# Patient Record
Sex: MALE | Race: WHITE | NOT HISPANIC OR LATINO | Employment: OTHER | URBAN - METROPOLITAN AREA
[De-identification: names, ages, dates, MRNs, and addresses within clinical notes are randomized per-mention and may not be internally consistent; named-entity substitution may affect disease eponyms.]

---

## 2022-06-21 DIAGNOSIS — N18.31 CHRONIC KIDNEY DISEASE, STAGE 3A: Primary | ICD-10-CM

## 2022-06-22 PROBLEM — E55.9 VITAMIN D DEFICIENCY: Status: ACTIVE | Noted: 2022-06-22

## 2022-06-22 PROBLEM — E78.2 HYPERLIPIDEMIA, MIXED: Status: ACTIVE | Noted: 2022-06-22

## 2022-06-22 PROBLEM — N25.81 SECONDARY HYPERPARATHYROIDISM OF RENAL ORIGIN: Status: ACTIVE | Noted: 2022-06-22

## 2022-06-22 PROBLEM — I25.10 CORONARY ARTERY DISEASE INVOLVING NATIVE CORONARY ARTERY OF NATIVE HEART: Status: ACTIVE | Noted: 2022-06-22

## 2022-06-22 PROBLEM — R80.1 PERSISTENT PROTEINURIA: Status: ACTIVE | Noted: 2022-06-22

## 2022-06-22 PROBLEM — N18.31 STAGE 3A CHRONIC KIDNEY DISEASE: Status: ACTIVE | Noted: 2022-06-22

## 2022-06-22 PROBLEM — E11.9 DIABETES MELLITUS, TYPE II, INSULIN DEPENDENT: Status: ACTIVE | Noted: 2022-06-22

## 2022-06-22 PROBLEM — Z79.4 DIABETES MELLITUS, TYPE II, INSULIN DEPENDENT: Status: ACTIVE | Noted: 2022-06-22

## 2022-06-22 PROBLEM — I10 ESSENTIAL HYPERTENSION: Status: ACTIVE | Noted: 2022-06-22

## 2022-06-22 RX ORDER — METFORMIN HYDROCHLORIDE 1000 MG/1
TABLET ORAL 2 TIMES DAILY WITH MEALS
COMMUNITY
Start: 2021-12-15 | End: 2022-10-27

## 2022-06-22 RX ORDER — AMIODARONE HYDROCHLORIDE 200 MG/1
100 TABLET ORAL DAILY
COMMUNITY
Start: 2022-06-01

## 2022-06-22 RX ORDER — OXYCODONE AND ACETAMINOPHEN 10; 325 MG/1; MG/1
1 TABLET ORAL EVERY 8 HOURS PRN
COMMUNITY

## 2022-06-22 RX ORDER — ERGOCALCIFEROL 1.25 MG/1
50000 CAPSULE ORAL
COMMUNITY
Start: 2022-01-06 | End: 2023-06-09

## 2022-06-22 RX ORDER — FUROSEMIDE 20 MG/1
20 TABLET ORAL
COMMUNITY

## 2022-06-22 RX ORDER — ALBUTEROL SULFATE 90 UG/1
AEROSOL, METERED RESPIRATORY (INHALATION)
COMMUNITY
Start: 2022-06-06 | End: 2022-06-23

## 2022-06-22 RX ORDER — PANTOPRAZOLE SODIUM 40 MG/1
TABLET, DELAYED RELEASE ORAL
COMMUNITY
Start: 2021-12-09 | End: 2022-06-23

## 2022-06-22 RX ORDER — ERGOCALCIFEROL 1.25 MG/1
CAPSULE ORAL
COMMUNITY
Start: 2022-03-31 | End: 2022-06-23

## 2022-06-22 RX ORDER — HUMAN INSULIN 100 [IU]/ML
INJECTION, SUSPENSION SUBCUTANEOUS
COMMUNITY
End: 2022-10-27

## 2022-06-22 RX ORDER — TIZANIDINE 4 MG/1
4 TABLET ORAL
COMMUNITY

## 2022-06-22 RX ORDER — PANTOPRAZOLE SODIUM 40 MG/1
40 TABLET, DELAYED RELEASE ORAL DAILY
COMMUNITY
Start: 2022-06-01

## 2022-06-22 RX ORDER — AMLODIPINE BESYLATE 5 MG/1
5 TABLET ORAL 2 TIMES DAILY
COMMUNITY
Start: 2021-07-29

## 2022-06-22 RX ORDER — AMLODIPINE BESYLATE 5 MG/1
5 TABLET ORAL 2 TIMES DAILY
COMMUNITY
Start: 2022-05-23 | End: 2022-06-23

## 2022-06-22 RX ORDER — LISINOPRIL 20 MG/1
20 TABLET ORAL DAILY
COMMUNITY
Start: 2022-06-01

## 2022-06-22 RX ORDER — ATENOLOL 50 MG/1
TABLET ORAL
COMMUNITY
End: 2022-06-23

## 2022-06-22 RX ORDER — GLIPIZIDE 5 MG/1
5 TABLET, FILM COATED, EXTENDED RELEASE ORAL DAILY
COMMUNITY
Start: 2022-01-11 | End: 2022-06-23

## 2022-06-22 RX ORDER — EMPAGLIFLOZIN 10 MG/1
TABLET, FILM COATED ORAL
COMMUNITY
End: 2022-06-23

## 2022-06-22 RX ORDER — LISINOPRIL 40 MG/1
TABLET ORAL
COMMUNITY
Start: 2022-05-05 | End: 2022-06-23

## 2022-06-22 RX ORDER — CALCIUM CARBONATE 400(1000)
TABLET,CHEWABLE ORAL
COMMUNITY
End: 2022-10-27

## 2022-06-22 RX ORDER — ALBUTEROL SULFATE 90 UG/1
2 AEROSOL, METERED RESPIRATORY (INHALATION) EVERY 6 HOURS PRN
COMMUNITY
Start: 2022-06-06 | End: 2022-06-23

## 2022-06-22 RX ORDER — GLIPIZIDE 5 MG/1
5 TABLET, FILM COATED, EXTENDED RELEASE ORAL
COMMUNITY
Start: 2021-12-15 | End: 2022-06-23

## 2022-06-22 RX ORDER — LEVOTHYROXINE SODIUM 75 UG/1
75 TABLET ORAL DAILY
COMMUNITY
Start: 2022-03-31

## 2022-06-22 RX ORDER — METHOCARBAMOL 500 MG/1
TABLET, FILM COATED ORAL
COMMUNITY
End: 2022-06-23

## 2022-06-22 RX ORDER — AMIODARONE HYDROCHLORIDE 200 MG/1
100 TABLET ORAL DAILY
COMMUNITY
Start: 2021-07-29 | End: 2022-06-23

## 2022-06-22 RX ORDER — CARVEDILOL 12.5 MG/1
6.25 TABLET ORAL 2 TIMES DAILY WITH MEALS
COMMUNITY

## 2022-06-22 RX ORDER — ORAL SEMAGLUTIDE 7 MG/1
TABLET ORAL
COMMUNITY
Start: 2022-03-31 | End: 2022-06-23

## 2022-06-22 RX ORDER — TAMSULOSIN HYDROCHLORIDE 0.4 MG/1
0.4 CAPSULE ORAL DAILY
COMMUNITY
Start: 2022-01-21

## 2022-06-22 RX ORDER — LEVOTHYROXINE SODIUM 50 UG/1
TABLET ORAL
COMMUNITY
Start: 2022-03-31 | End: 2022-06-23

## 2022-06-22 RX ORDER — DOXYCYCLINE 100 MG/1
100 CAPSULE ORAL 2 TIMES DAILY
COMMUNITY
Start: 2022-06-06 | End: 2022-06-23

## 2022-06-23 ENCOUNTER — OFFICE VISIT (OUTPATIENT)
Dept: NEPHROLOGY | Facility: CLINIC | Age: 72
End: 2022-06-23
Payer: COMMERCIAL

## 2022-06-23 VITALS
WEIGHT: 262.19 LBS | HEART RATE: 63 BPM | HEIGHT: 76 IN | SYSTOLIC BLOOD PRESSURE: 139 MMHG | OXYGEN SATURATION: 98 % | DIASTOLIC BLOOD PRESSURE: 68 MMHG | BODY MASS INDEX: 31.93 KG/M2 | TEMPERATURE: 99 F

## 2022-06-23 DIAGNOSIS — R80.1 PERSISTENT PROTEINURIA: ICD-10-CM

## 2022-06-23 DIAGNOSIS — E11.9 DIABETES MELLITUS, TYPE II, INSULIN DEPENDENT: Primary | ICD-10-CM

## 2022-06-23 DIAGNOSIS — E55.9 VITAMIN D DEFICIENCY: ICD-10-CM

## 2022-06-23 DIAGNOSIS — E78.2 HYPERLIPIDEMIA, MIXED: ICD-10-CM

## 2022-06-23 DIAGNOSIS — I25.10 CORONARY ARTERY DISEASE INVOLVING NATIVE CORONARY ARTERY OF NATIVE HEART, UNSPECIFIED WHETHER ANGINA PRESENT: ICD-10-CM

## 2022-06-23 DIAGNOSIS — N25.81 SECONDARY HYPERPARATHYROIDISM OF RENAL ORIGIN: ICD-10-CM

## 2022-06-23 DIAGNOSIS — N18.31 STAGE 3A CHRONIC KIDNEY DISEASE: ICD-10-CM

## 2022-06-23 DIAGNOSIS — I10 ESSENTIAL HYPERTENSION: ICD-10-CM

## 2022-06-23 DIAGNOSIS — Z79.4 DIABETES MELLITUS, TYPE II, INSULIN DEPENDENT: Primary | ICD-10-CM

## 2022-06-23 PROCEDURE — 99213 PR OFFICE/OUTPT VISIT, EST, LEVL III, 20-29 MIN: ICD-10-PCS | Mod: ,,, | Performed by: NURSE PRACTITIONER

## 2022-06-23 PROCEDURE — 99213 OFFICE O/P EST LOW 20 MIN: CPT | Mod: ,,, | Performed by: NURSE PRACTITIONER

## 2022-06-23 RX ORDER — ACETAMINOPHEN 500 MG
TABLET ORAL
COMMUNITY

## 2022-06-23 RX ORDER — UBIDECARENONE 75 MG
1000 CAPSULE ORAL DAILY
COMMUNITY

## 2022-06-23 NOTE — PROGRESS NOTES
Pt Name:  William Arthur Parmer Sr.  Pt :  1950  Pt MRN:  [id number]    Date: 2022    Reason for visit:   William Arthur Parmer Sr. is a 71 y.o. c male presenting for CKD follow up with serum creatinine 1.49, eGFR 46 and Chronic Kidney Disease Stage 3a.     Chief Complaint:   The patient denies any complaints today.  He is accompanied today by his daughter in law, Tracy Parmer.     HPI:  The patient is being seen today for follow up CKD and the status of his kidney function. Since the last visit, patient reports he has been battling bronchitis for approximately 3 weeks.  The patient denies fatigue, weakness, poor appetite, metallic taste, nausea, cramping, chest pain, palpitations, SOB, orthopnea, PND, edema, trouble concentrating, decreased urination.      Home BPs when checked are <160/80-90 per patient due to taking Nyquil for his bronchitis; otherwise it is near goal at home. The patient is following a low sodium diet. The patient is avoiding NSAIDs. The patient is drinking 64+ oz of water daily.     Since last visit on 21 patient reports above noted changes in medical history.      History:   Past Medical History:   Diagnosis Date    Melgar esophagus     Cancer     skin cancer    Coronary artery disease     Disorder of kidney and ureter     DM type 2 (diabetes mellitus, type 2)     HTN (hypertension)     Internal hemorrhage     Irregular Z line of esophagus     Mixed hyperlipidemia      Past Surgical History:   Procedure Laterality Date    BACK SURGERY,LEFT FOOT,      CARDIAC CATHETERIZATION      CATARACT EXTRACTION      COLONOSCOPY      CORONARY ARTERY BYPASS GRAFT      CORONARY STENT PLACEMENT      ESOPHAGOGASTRODUODENOSCOPY      UPPER GASTROINTESTINAL ENDOSCOPY       Family History   Problem Relation Age of Onset    Hypertension Father     Diabetes Father     Cancer Father      Social History     Substance and Sexual Activity   Alcohol Use Never     Social History      Substance and Sexual Activity   Drug Use Never     Social History     Substance and Sexual Activity   Sexual Activity Not on file     has no history on file for sexual activity.  Social History     Tobacco Use   Smoking Status Former Smoker   Smokeless Tobacco Never Used       Allergies:  Review of patient's allergies indicates:   Allergen Reactions    Atorvastatin Shortness Of Breath    Canagliflozin     Empagliflozin     Glipizide      Drops Blood sugar to much    Hydrocodone-acetaminophen Itching and Other (See Comments)     Other reaction(s): chest feels constricted         Current Outpatient Medications:     amiodarone (PACERONE) 200 MG Tab, Take 100 mg by mouth once daily., Disp: , Rfl:     amLODIPine (NORVASC) 5 MG tablet, Take 5 mg by mouth 2 (two) times daily., Disp: , Rfl:     carvediloL (COREG) 12.5 MG tablet, Take 12.5 mg by mouth 2 (two) times daily with meals., Disp: , Rfl:     cholecalciferol, vitamin D3, (VITAMIN D3) 50 mcg (2,000 unit) Cap capsule, Take by mouth once daily., Disp: , Rfl:     cyanocobalamin (VITAMIN B-12) 500 MCG tablet, Take 1,000 mcg by mouth once daily., Disp: , Rfl:     furosemide (LASIX) 20 MG tablet, Take 20 mg by mouth as needed., Disp: , Rfl:     insulin NPH isoph U-100 human (NOVOLIN N FLEXPEN) 100 unit/mL (3 mL) InPn, Novolin N Flexpen 100 unit/mL (3 mL) subcutaneous insulin pen  INJECT 30 units QAM, Disp: , Rfl:     levothyroxine (SYNTHROID) 50 MCG tablet, Take 50 mcg by mouth once daily., Disp: , Rfl:     lisinopriL (PRINIVIL,ZESTRIL) 20 MG tablet, Take 20 mg by mouth 2 (two) times daily., Disp: , Rfl:     metFORMIN (GLUCOPHAGE) 1000 MG tablet, Take by mouth 2 (two) times daily with meals., Disp: , Rfl:     oxyCODONE-acetaminophen (PERCOCET)  mg per tablet, Take 1 tablet by mouth every 8 (eight) hours as needed., Disp: , Rfl:     pantoprazole (PROTONIX) 40 MG tablet, Take 40 mg by mouth once daily., Disp: , Rfl:     rivaroxaban (XARELTO) 20 mg  "Tab, Take 20 mg by mouth once daily at 6am., Disp: , Rfl:     tamsulosin (FLOMAX) 0.4 mg Cap, Take 0.4 mg by mouth once daily., Disp: , Rfl:     tiZANidine (ZANAFLEX) 4 MG tablet, Take 4 mg by mouth as needed., Disp: , Rfl:     VITAMIN D2 1,250 mcg (50,000 unit) capsule, Take 50,000 Units by mouth every 7 days., Disp: , Rfl:     calcium carbonate 400 mg calcium (1,000 mg) Chew, Take by mouth., Disp: , Rfl:     ROS:  Review of Systems   Constitutional: Negative for activity change, appetite change and fatigue.   Eyes: Negative for visual disturbance.   Respiratory: Negative for shortness of breath.    Cardiovascular: Negative for chest pain, palpitations and leg swelling.   Gastrointestinal: Negative for abdominal pain, constipation, nausea and vomiting.   Genitourinary: Negative for decreased urine volume, difficulty urinating, dysuria, flank pain, frequency and urgency.   Musculoskeletal: Negative for back pain and joint swelling.   Skin: Negative for color change and rash.   Neurological: Negative for dizziness, weakness, light-headedness and headaches.       Physical Exam:   Vitals:   Vitals:    06/23/22 1352   BP: 139/68   Pulse: 63   Temp: 98.5 °F (36.9 °C)   SpO2: 98%   Weight: 118.9 kg (262 lb 3.2 oz)   Height: 6' 3.5" (1.918 m)   PainSc: 0-No pain     Body mass index is 32.34 kg/m².    Physical Exam  Vitals reviewed.   Constitutional:       General: He is awake. He is not in acute distress.  HENT:      Head: Normocephalic.      Mouth/Throat:      Mouth: Mucous membranes are moist.   Eyes:      General: No scleral icterus.     Conjunctiva/sclera: Conjunctivae normal.      Pupils: Pupils are equal, round, and reactive to light.   Cardiovascular:      Rate and Rhythm: Normal rate and regular rhythm.      Heart sounds: Normal heart sounds, S1 normal and S2 normal. No murmur heard.     Comments: LLE trace pedal edema   Pulmonary:      Effort: Pulmonary effort is normal.      Breath sounds: Normal breath " sounds. No wheezing, rhonchi or rales.   Abdominal:      General: There is no distension.      Palpations: Abdomen is soft. There is no mass.   Musculoskeletal:         General: No swelling.      Right lower leg: No edema.      Left lower leg: Edema present.   Skin:     General: Skin is warm and dry.      Nails: There is no clubbing.   Neurological:      General: No focal deficit present.      Mental Status: He is alert and oriented to person, place, and time.   Psychiatric:         Mood and Affect: Mood and affect normal.         Thought Content: Thought content normal.           Labs/Tests:  Lab Results   Component Value Date     06/22/2022    K 4.3 06/22/2022     06/22/2022    CO2 27 06/22/2022    BUN 13 06/22/2022    CREATININE 1.49 (H) 06/22/2022    CREATININE 1.30 03/15/2022    CREATININE 1.37 (H) 12/15/2021    GLUCOSE 180 (H) 06/22/2022    CALCIUM 9.7 06/22/2022    ALBUMIN 4.3 06/22/2022    EGFRNONAA 46 (L) 06/22/2022    EGFRNONAA 54 (L) 03/15/2022    EGFRNONAA 51 (L) 12/15/2021    PTH 95 (H) 06/22/2022    HGB 13.3 06/22/2022    HGBA1C 8.1 (H) 06/21/2022    TRIG 340 (H) 04/27/2021    CHOL 173 04/27/2021    HDL 36 (L) 04/27/2021    LDLCALC 69 04/27/2021    LABVLDL 68 (H) 04/27/2021     Component Ref Range & Units 14:01   (6/22/22) 3 mo ago   (3/15/22) 7 mo ago   (11/18/21) 11 mo ago   (7/14/21) 1 yr ago   (10/9/20)   Protein, Urine mg/dL mg/dL 123  56  62  105  60    Creatinine, Urine mg/dL mg/dL 70.5  35.0  49.8  91.6  45.2    Urine Protein/Creatinine Ratio <0.2 mg of protein/mg of creatinine 1.7 High   1.6 High   1.3 High   1.1 High   1.3 High       Component Ref Range & Units 1 d ago 6 mo ago   Vitamin D 25-Hydroxy, Blood 30.0 - 100.0 ng/mL 49.1  13.2 Low  CM            Diagnosis:  1. Diabetes mellitus, type II, insulin dependent  Renal Function Panel    Renal Function Panel   2. Essential hypertension  Renal Function Panel    Renal Function Panel   3. Vitamin D deficiency     4. Hyperlipidemia,  mixed  Renal Function Panel    Renal Function Panel   5. Secondary hyperparathyroidism of renal origin  PTH, Intact    PTH, Intact   6. Persistent proteinuria  Renal Function Panel    Protein/Creatinine Ratio, Urine    Renal Function Panel    Protein/Creatinine Ratio, Urine   7. Coronary artery disease involving native coronary artery of native heart, unspecified whether angina present     8. Stage 3a chronic kidney disease  Hemoglobin    Renal Function Panel    Hemoglobin    Renal Function Panel         Plan:  1. Diabetes mellitus, type II, insulin dependent - uncontrolled with HgbA1c 8.1 on 6/21/22 - Monitor BS; Continue Novolog and Metformin    2. Essential hypertension - at goal - has been a little elevated at home recently due to taking Nyquil for his bronchitis; Monitor BP, 2 gram NA diet;  Continue Norvasc 10 mg daily, Coreg 12.5 mg po BID, Lasix 20 mg po daily, Lisinopril 20 mg po BID,    3. Vitamin D deficiency -  Vitamin D 49.1 - managed by Endocrinology    4. Hyperlipidemia, mixed - Continue control - allergic to Atorvastatin    5. Secondary hyperparathyroidism of renal origin - PTH 95 - No indication for specialized Vitamin D at this time    6. Persistent proteinuria - 1700 mg - up from 1300 mg - ACE   7. Coronary artery disease involving native coronary artery of native heart, unspecified whether angina present    8. Stage 3a chronic kidney disease - serum creatinine 1.49 / eGFR 46; Avoid NSAIDS, assure 64 oz of water daily; Meds per # 1, 2, 3,          My reconciliation of medication should not condone or support use of medications that have been previously prescribed for patients by other providers.  I am only documenting the current medications patients is taking and may change doses, add or discontinue medications based on information provided by the patient.     The patient has been provided with their current level of kidney function including eGFR and creatinine.    We discussed the potential for  common complications of CKD including anemia, electrolyte abnormalities, abnormal fluid balance, mineral bone disease and malnutrition.    We discussed strategies to slow the progression of their kidney disease includin. Avoid nephrotoxic agents. Avoid over-the-counter and prescription NSAIDs (Ibuprofren, Motrin, Naproxyn, Aleve, Mobic, Celebrex, Toradol, Advil). All of these can worsen kidney function, elevate BP, cause fluid retention/swelling and elevate potassium. Avoid iodine contrast agents and gadolinium, which can worsen kidney function and/or cause kidney failure. Avoid phosphosoda bowel preps which can worsen kidney function.  2. Work to improve modifiable risk factors. Aim for good control of blood glucose without episodes of hypoglycemia. Notify the provider managing your diabetes if your blood glucose < 60. Aim for good blood pressure control without episodes of hypotension. Call the office if your systolic blood pressure is consistently < 110. Aim for good control of your cholesterol.  ? AIC goal <7.0  ? BP goal <130/80        Keeping these in goal range will help prevent progression of cardiovascular disease            and chronic kidney disease.    We discussed dietary modifications:  ? Low sodium diet: 2 gm/d or less  ? Limit/avoid high potassium foods  ? Avoid potassium containing salt substitutes  ? Limit/avoid high phosphorus foods  ? Limit daily protein intake to 0.8-1 gm/kg of your ideal body weight.    We discussed lifestyle modifications:  ? Make sure you are drinking plenty of fluids--64 ounces (1/2 gallon) daily  ? Exercise at least 30 minutes 5 x per week (total 150 minutes per week), example brisk walking  ? Achieve and maintain a healthy weight (BMI 20-25)  ? Limit alcohol consumption to <2 drinks per day  ? Stop smoking  ? Make sure you stay current on important vaccines-- pneumonia vaccines (Pneumovax and Prevnar), flu vaccine, Hepatitis B (especially patients nearing renal  replacement therapy and planning hemodialysis) and Covid-19 vaccine.     Recommendations:  1. Monitor your BP at home daily and record.  2. Bring readings to your next appt.  3. Call the office if your BP is persistently >130/80.  4. Seek urgent medical attention with signs and symptoms of uremia - extreme weakness, fatigue, confusion, anorexia, metallic taste in mouth, hiccoughs, cramping, itching, chest pain, swelling, or trouble sleeping.    Follow Up:   Follow up in about 4 months (around 10/23/2022).

## 2022-10-26 PROBLEM — N18.32 STAGE 3B CHRONIC KIDNEY DISEASE: Status: ACTIVE | Noted: 2022-10-26

## 2022-10-26 PROBLEM — E11.21 DIABETIC NEPHROPATHY ASSOCIATED WITH TYPE 2 DIABETES MELLITUS: Status: ACTIVE | Noted: 2022-10-26

## 2022-10-26 PROBLEM — R80.9 NON-NEPHROTIC RANGE PROTEINURIA: Status: ACTIVE | Noted: 2022-10-26

## 2022-10-26 PROBLEM — I12.9 HYPERTENSION WITH IMPAIRED RENAL FUNCTION: Status: ACTIVE | Noted: 2022-10-26

## 2022-10-26 PROBLEM — N18.31 STAGE 3A CHRONIC KIDNEY DISEASE: Status: RESOLVED | Noted: 2022-06-22 | Resolved: 2022-10-26

## 2022-10-26 NOTE — PROGRESS NOTES
Pt Name:  William Arthur Parmer Sr.  Pt :  1950  Pt MRN:  [id number]    Date: 10/27/2022    Reason for visit:   William Arthur Parmer Sr. is a 72 y.o. c male presenting for CKD follow up with serum creatinine 1.78, eGFR 37 and Chronic Kidney Disease Stage 3b.     Chief Complaint:   The patient denies any complaints today.    HPI:  The patient is being seen today for follow up CKD and the status of his kidney function. Since the last visit, patient reports no health changes.  However, he has had some medication changes as follows:  Metformin and Novolog was stopped, and he was started on Farxiga, Lantus, Pletal 100 mg po BID.   The patient denies fatigue, weakness, poor appetite, metallic taste, nausea, cramping, chest pain, palpitations, SOB, orthopnea, PND, edema, trouble concentrating, decreased urination.      Home BPs when checked are <130/80 per patient. The patient is following a low sodium diet. The patient is avoiding NSAIDs. The patient is drinking 64 + oz of water daily.     Since last visit on 22 patient reports no changes in medical history.      History:   Past Medical History:   Diagnosis Date    Back pain     Melgar esophagus     Cancer     skin cancer    Coronary artery disease     Disorder of kidney and ureter     DM type 2 (diabetes mellitus, type 2)     HTN (hypertension)     Internal hemorrhage     Irregular Z line of esophagus     Mixed hyperlipidemia     Neuropathy      Past Surgical History:   Procedure Laterality Date    BACK SURGERY,LEFT FOOT,      CARDIAC CATHETERIZATION      CATARACT EXTRACTION      COLONOSCOPY      CORONARY ARTERY BYPASS GRAFT      CORONARY STENT PLACEMENT      ESOPHAGOGASTRODUODENOSCOPY      UPPER GASTROINTESTINAL ENDOSCOPY       Family History   Problem Relation Age of Onset    Hypertension Father     Diabetes Father     Cancer Father      Social History     Substance and Sexual Activity   Alcohol Use Never     Social History     Substance and Sexual  Activity   Drug Use Never     Social History     Substance and Sexual Activity   Sexual Activity Not on file     has no history on file for sexual activity.  Social History     Tobacco Use   Smoking Status Former   Smokeless Tobacco Never       Allergies:  Review of patient's allergies indicates:   Allergen Reactions    Atorvastatin Shortness Of Breath    Canagliflozin     Empagliflozin     Glipizide      Drops Blood sugar to much    Hydrocodone-acetaminophen Itching and Other (See Comments)     Other reaction(s): chest feels constricted         Current Outpatient Medications:     amiodarone (PACERONE) 200 MG Tab, Take 100 mg by mouth once daily., Disp: , Rfl:     amLODIPine (NORVASC) 5 MG tablet, Take 5 mg by mouth 2 (two) times daily., Disp: , Rfl:     carvediloL (COREG) 12.5 MG tablet, Take 12.5 mg by mouth 2 (two) times daily with meals., Disp: , Rfl:     cholecalciferol, vitamin D3, (VITAMIN D3) 50 mcg (2,000 unit) Cap capsule, Take by mouth once daily., Disp: , Rfl:     cilostazoL (PLETAL) 100 MG Tab, Take 100 mg by mouth 2 (two) times daily., Disp: , Rfl:     cyanocobalamin 500 MCG tablet, Take 1,000 mcg by mouth once daily., Disp: , Rfl:     dapagliflozin (FARXIGA) 10 mg tablet, Take 10 mg by mouth once daily., Disp: , Rfl:     ezetimibe (ZETIA) 10 mg tablet, Take 10 mg by mouth once daily., Disp: , Rfl:     furosemide (LASIX) 20 MG tablet, Take 20 mg by mouth as needed., Disp: , Rfl:     gabapentin (NEURONTIN) 100 MG capsule, Take 100 mg by mouth once daily., Disp: , Rfl:     insulin (LANTUS SOLOSTAR U-100 INSULIN) glargine 100 units/mL SubQ pen, Inject 30 Units into the skin 2 (two) times a day. 30 units in AM, 30 units in PM, Disp: , Rfl:     levothyroxine (SYNTHROID) 50 MCG tablet, Take 75 mcg by mouth once daily., Disp: , Rfl:     liraglutide 0.6 mg/0.1 mL, 18 mg/3 mL, subq PNIJ (VICTOZA 2-MICHAEL) 0.6 mg/0.1 mL (18 mg/3 mL) PnIj pen, Inject 0.6 mg into the skin once daily. 1.8 units qd, Disp: , Rfl:      "lisinopriL (PRINIVIL,ZESTRIL) 20 MG tablet, Take 20 mg by mouth 2 (two) times daily., Disp: , Rfl:     oxyCODONE-acetaminophen (PERCOCET)  mg per tablet, Take 1 tablet by mouth every 8 (eight) hours as needed., Disp: , Rfl:     pantoprazole (PROTONIX) 40 MG tablet, Take 40 mg by mouth once daily., Disp: , Rfl:     rivaroxaban (XARELTO) 20 mg Tab, Take 20 mg by mouth once daily at 6am., Disp: , Rfl:     tamsulosin (FLOMAX) 0.4 mg Cap, Take 0.4 mg by mouth once daily., Disp: , Rfl:     tiZANidine (ZANAFLEX) 4 MG tablet, Take 4 mg by mouth as needed., Disp: , Rfl:     VITAMIN D2 1,250 mcg (50,000 unit) capsule, Take 50,000 Units by mouth every 7 days., Disp: , Rfl:     ROS:  Review of Systems   Constitutional:  Negative for activity change, appetite change and fatigue.   Eyes:  Negative for visual disturbance.   Respiratory:  Negative for shortness of breath.    Cardiovascular:  Negative for chest pain, palpitations and leg swelling.   Gastrointestinal:  Negative for abdominal pain, constipation, nausea and vomiting.   Genitourinary:  Negative for decreased urine volume, difficulty urinating, dysuria, flank pain, frequency and urgency.   Musculoskeletal:  Negative for back pain and joint swelling.   Skin:  Negative for color change and rash.   Neurological:  Negative for dizziness, weakness, light-headedness and headaches.     Physical Exam:   Vitals:   Vitals:    10/27/22 1323   BP: 136/69   Pulse: 62   SpO2: 97%   Weight: 118.4 kg (261 lb)   Height: 6' 3.5" (1.918 m)   PainSc:   6   PainLoc: Back     Body mass index is 32.19 kg/m².    Physical Exam  Vitals reviewed.   Constitutional:       General: He is awake. He is not in acute distress.  HENT:      Head: Normocephalic.      Mouth/Throat:      Mouth: Mucous membranes are moist.   Eyes:      General: No scleral icterus.     Conjunctiva/sclera: Conjunctivae normal.      Pupils: Pupils are equal, round, and reactive to light.   Cardiovascular:      Rate and " Rhythm: Normal rate and regular rhythm.      Heart sounds: Normal heart sounds, S1 normal and S2 normal. No murmur heard.  Pulmonary:      Effort: Pulmonary effort is normal.      Breath sounds: Normal breath sounds. No wheezing, rhonchi or rales.   Abdominal:      General: There is no distension.      Palpations: Abdomen is soft. There is no mass.   Musculoskeletal:         General: No swelling.      Right lower leg: No edema.      Left lower leg: No edema.   Skin:     General: Skin is warm and dry.      Nails: There is no clubbing.   Neurological:      General: No focal deficit present.      Mental Status: He is alert and oriented to person, place, and time.   Psychiatric:         Mood and Affect: Mood and affect normal.         Thought Content: Thought content normal.       Labs/Tests:  Lab Results   Component Value Date     10/20/2022    K 4.4 10/20/2022     10/20/2022    CO2 27 10/20/2022    BUN 22 10/20/2022    CREATININE 1.78 (H) 10/20/2022    CREATININE 1.49 (H) 09/26/2022    CREATININE 1.49 (H) 06/22/2022    GLUCOSE 255 (H) 10/20/2022    CALCIUM 9.7 10/20/2022    PHOSPHORUS 4.0 10/20/2022    ALBUMIN 4.4 10/20/2022    EGFRNONAA 37 (L) 10/20/2022    EGFRNONAA 46 (L) 09/26/2022    EGFRNONAA 46 (L) 06/22/2022    ESTGFRAFRICA 42 (L) 10/20/2022    ESTGFRAFRICA 53 (L) 09/26/2022    ESTGFRAFRICA 53 (L) 06/22/2022    PTH 73 (H) 10/20/2022    HGB 13.0 10/20/2022    HGB 13.3 06/22/2022    HGB 12.5 03/15/2022    HGBA1C 8.3 (H) 09/26/2022    TRIG 212 (H) 09/26/2022    CHOL 148 09/26/2022    HDL 28 (L) 09/26/2022    LDLCALC 78 09/26/2022    LABVLDL 42 (H) 09/26/2022    HWSXURXP27TM 49.1 06/21/2022     Component Ref Range & Units 6 d ago   (10/20/22) 4 mo ago   (6/22/22) 7 mo ago   (3/15/22) 11 mo ago   (11/18/21) 1 yr ago   (7/14/21) 1 yr ago   (4/27/21)   Protein, Urine mg/dL 35  123  56  62  105     Creatinine, Urine mg/dL 45.1  70.5  35.0  49.8  91.6  84.8    Urine Protein/Creatinine Ratio <0.2 mg of  protein/mg of creatinine 0.8 High   1.7 High   1.6 High   1.3 High   1.1 High            Diagnosis:  1. Diabetic nephropathy associated with type 2 diabetes mellitus  Renal Function Panel    Renal Function Panel      2. Hypertension with impaired renal function  Renal Function Panel    Renal Function Panel      3. Stage 3b chronic kidney disease  Hemoglobin    Vitamin D    Renal Function Panel    Protein/Creatinine Ratio, Urine    PTH, Intact    Hemoglobin    Vitamin D    Renal Function Panel    Protein/Creatinine Ratio, Urine    PTH, Intact      4. Non-nephrotic range proteinuria  Protein/Creatinine Ratio, Urine    Protein/Creatinine Ratio, Urine      5. Secondary hyperparathyroidism of renal origin  PTH, Intact    PTH, Intact      6. Vitamin D deficiency  Vitamin D    Vitamin D      7. Hyperlipidemia, mixed             Plan:  Diabetic nephropathy associated with type 2 diabetes mellitus  Poorly Controlled with HgbA1c 8.3% on 9/26/22 - Continue Farxiga 10 mg po daily, Lantus, Victoza,     Hypertension with impaired renal function  At goal, Monitor BP, 2 Gram NA diet, Continue Amlodipine 5 mg po BID, Coreg 12.5 mg po BID, Lisinopril 20 mg po BID, Lasix 20 mg po daily only PRN     Stage 3b chronic kidney disease  Serum Creatinine 1.78 / eGFR 37 - without Anemia - Avoid NSAIDS, Assure 64 oz of water daily, Meds per med list above     Non-nephrotic range proteinuria  800 mg - down from 1700 mg - ACE / SGLT2     Secondary hyperparathyroidism of renal origin  PTH 73 - No indication for specialized Vitamin D at this time.     Vitamin D deficiency  Vitamin D 49.1 - Managed by Endocrinology -     Hyperlipidemia, mixed  Continue Zetia 10 mg po daily        My reconciliation of medication should not condone or support use of medications that have been previously prescribed for patients by other providers.  I am only documenting the current medications patients is taking and may change doses, add or discontinue medications  based on information provided by the patient.     The patient has been provided with their current level of kidney function including eGFR and creatinine.    We discussed the potential for common complications of CKD including anemia, electrolyte abnormalities, abnormal fluid balance, mineral bone disease and malnutrition.    We discussed strategies to slow the progression of their kidney disease including:  Avoid nephrotoxic agents. Avoid over-the-counter and prescription NSAIDs (Ibuprofren, Motrin, Naproxyn, Aleve, Mobic, Celebrex, Toradol, Advil). All of these can worsen kidney function, elevate BP, cause fluid retention/swelling and elevate potassium. Avoid iodine contrast agents and gadolinium, which can worsen kidney function and/or cause kidney failure. Avoid phosphosoda bowel preps which can worsen kidney function.  Work to improve modifiable risk factors. Aim for good control of blood glucose without episodes of hypoglycemia. Notify the provider managing your diabetes if your blood glucose < 60. Aim for good blood pressure control without episodes of hypotension. Call the office if your systolic blood pressure is consistently < 110. Aim for good control of your cholesterol.  AIC goal <7.0  BP goal <130/80        Keeping these in goal range will help prevent progression of cardiovascular disease            and chronic kidney disease.    We discussed dietary modifications:  Low sodium diet: 2 gm/d or less  Limit/avoid high potassium foods  Avoid potassium containing salt substitutes  Limit/avoid high phosphorus foods  Limit daily protein intake to 0.8-1 gm/kg of your ideal body weight.    We discussed lifestyle modifications:  Make sure you are drinking plenty of fluids--64 ounces (1/2 gallon) daily  Exercise at least 30 minutes 5 x per week (total 150 minutes per week), example brisk walking  Achieve and maintain a healthy weight (BMI 20-25)  Limit alcohol consumption to <2 drinks per day  Stop smoking  Make  sure you stay current on important vaccines-- pneumonia vaccines (Pneumovax and Prevnar), flu vaccine, Hepatitis B (especially patients nearing renal replacement therapy and planning hemodialysis) and Covid-19 vaccine.     Recommendations:  Monitor your BP at home daily and record.  Bring readings to your next appt.  Call the office if your BP is persistently >130/80.  Seek urgent medical attention with signs and symptoms of uremia - extreme weakness, fatigue, confusion, anorexia, metallic taste in mouth, hiccoughs, cramping, itching, chest pain, swelling, or trouble sleeping.    Follow Up:   Follow up in about 4 months (around 2/27/2023).

## 2022-10-26 NOTE — ASSESSMENT & PLAN NOTE
Poorly Controlled with HgbA1c 8.3% on 9/26/22 - Continue Farxiga 10 mg po daily, Lantus, Victoza,

## 2022-10-26 NOTE — ASSESSMENT & PLAN NOTE
At goal, Monitor BP, 2 Gram NA diet, Continue Amlodipine 5 mg po BID, Coreg 12.5 mg po BID, Lisinopril 20 mg po BID, Lasix 20 mg po daily only PRN

## 2022-10-27 ENCOUNTER — OFFICE VISIT (OUTPATIENT)
Dept: NEPHROLOGY | Facility: CLINIC | Age: 72
End: 2022-10-27
Payer: COMMERCIAL

## 2022-10-27 VITALS
DIASTOLIC BLOOD PRESSURE: 69 MMHG | HEIGHT: 76 IN | WEIGHT: 261 LBS | BODY MASS INDEX: 31.78 KG/M2 | HEART RATE: 62 BPM | OXYGEN SATURATION: 97 % | SYSTOLIC BLOOD PRESSURE: 136 MMHG

## 2022-10-27 DIAGNOSIS — E55.9 VITAMIN D DEFICIENCY: ICD-10-CM

## 2022-10-27 DIAGNOSIS — N18.32 STAGE 3B CHRONIC KIDNEY DISEASE: ICD-10-CM

## 2022-10-27 DIAGNOSIS — E11.21 DIABETIC NEPHROPATHY ASSOCIATED WITH TYPE 2 DIABETES MELLITUS: Primary | ICD-10-CM

## 2022-10-27 DIAGNOSIS — N25.81 SECONDARY HYPERPARATHYROIDISM OF RENAL ORIGIN: ICD-10-CM

## 2022-10-27 DIAGNOSIS — I12.9 HYPERTENSION WITH IMPAIRED RENAL FUNCTION: ICD-10-CM

## 2022-10-27 DIAGNOSIS — E78.2 HYPERLIPIDEMIA, MIXED: ICD-10-CM

## 2022-10-27 DIAGNOSIS — R80.9 NON-NEPHROTIC RANGE PROTEINURIA: ICD-10-CM

## 2022-10-27 PROCEDURE — 99213 OFFICE O/P EST LOW 20 MIN: CPT | Mod: ,,, | Performed by: NURSE PRACTITIONER

## 2022-10-27 PROCEDURE — 99213 PR OFFICE/OUTPT VISIT, EST, LEVL III, 20-29 MIN: ICD-10-PCS | Mod: ,,, | Performed by: NURSE PRACTITIONER

## 2022-10-27 RX ORDER — INSULIN GLARGINE 100 [IU]/ML
30 INJECTION, SOLUTION SUBCUTANEOUS 2 TIMES DAILY
COMMUNITY
End: 2023-03-03

## 2022-10-27 RX ORDER — EZETIMIBE 10 MG/1
10 TABLET ORAL DAILY
COMMUNITY

## 2022-10-27 RX ORDER — LIRAGLUTIDE 6 MG/ML
INJECTION SUBCUTANEOUS DAILY
COMMUNITY
End: 2023-10-19

## 2022-10-27 RX ORDER — DAPAGLIFLOZIN 10 MG/1
10 TABLET, FILM COATED ORAL DAILY
COMMUNITY

## 2022-10-27 RX ORDER — CILOSTAZOL 100 MG/1
100 TABLET ORAL 2 TIMES DAILY
COMMUNITY

## 2022-10-27 RX ORDER — GABAPENTIN 100 MG/1
100 CAPSULE ORAL DAILY
COMMUNITY
End: 2023-06-09

## 2023-03-02 PROBLEM — N25.81 SECONDARY HYPERPARATHYROIDISM OF RENAL ORIGIN: Status: RESOLVED | Noted: 2022-06-22 | Resolved: 2023-03-02

## 2023-03-02 PROBLEM — E11.9 DIABETES MELLITUS, TYPE II, INSULIN DEPENDENT: Status: RESOLVED | Noted: 2022-06-22 | Resolved: 2023-03-02

## 2023-03-02 PROBLEM — R80.1 PERSISTENT PROTEINURIA: Status: RESOLVED | Noted: 2022-06-22 | Resolved: 2023-03-02

## 2023-03-02 PROBLEM — Z79.4 DIABETES MELLITUS, TYPE II, INSULIN DEPENDENT: Status: RESOLVED | Noted: 2022-06-22 | Resolved: 2023-03-02

## 2023-03-02 PROBLEM — I10 ESSENTIAL HYPERTENSION: Status: RESOLVED | Noted: 2022-06-22 | Resolved: 2023-03-02

## 2023-03-02 NOTE — ASSESSMENT & PLAN NOTE
Serum Creatinine 2.15 / eGFR 31 (1.78 / eGFR 37 on 10/20/22) - without Anemia - without SHPT - Avoid NSAIDS, Assure 64 oz of water daily, Meds per med list above

## 2023-03-02 NOTE — ASSESSMENT & PLAN NOTE
Vitamin D 85.6 - Managed by Endocrinology -  Currently on 50,000 units weekly plus 2000 units po daily

## 2023-03-02 NOTE — PROGRESS NOTES
Pt Name:  William Arthur Parmer Sr.  Pt :  1950  Pt MRN:  [id number]    Date: 3/3/2023    Reason for visit:   William Arthur Parmer Sr. is a 72 y.o. c male presenting for CKD follow up with serum creatinine 2.15, eGFR 31 and Chronic Kidney Disease Stage 3b.     Chief Complaint:   The patient denies any complaints today.    HPI:  The patient is being seen today for follow up CKD and the status of his kidney function. Since the last visit, patient reports he had his BS drop at home last night to 56.  He has recently had his Insulin regimen changed by his PCP.  The patient denies fatigue, weakness, poor appetite, metallic taste, nausea, cramping, chest pain, palpitations, SOB, orthopnea, PND, edema, trouble concentrating, decreased urination.  He does occasionally have trouble with his urinary stream.     Home BPs when checked are <130/80 per patient. The patient is following a low sodium diet. The patient is avoiding NSAIDs. The patient is drinking 64 oz of water daily.     Since last visit on 10/27/22 patient reports above noted changes in medical history.      History:   Past Medical History:   Diagnosis Date    Back pain     Melgar esophagus     Cancer     skin cancer    Coronary artery disease     Diabetes mellitus, type II, insulin dependent 2022    Disorder of kidney and ureter     DM type 2 (diabetes mellitus, type 2)     Essential hypertension 2022    HTN (hypertension)     Internal hemorrhage     Irregular Z line of esophagus     Mixed hyperlipidemia     Neuropathy     Persistent proteinuria 2022    Secondary hyperparathyroidism of renal origin 2022     Past Surgical History:   Procedure Laterality Date    BACK SURGERY,LEFT FOOT,      CARDIAC CATHETERIZATION      CATARACT EXTRACTION      COLONOSCOPY      CORONARY ARTERY BYPASS GRAFT      CORONARY STENT PLACEMENT      ESOPHAGOGASTRODUODENOSCOPY      UPPER GASTROINTESTINAL ENDOSCOPY       Family History   Problem Relation Age of  Onset    Hypertension Father     Diabetes Father     Cancer Father      Social History     Substance and Sexual Activity   Alcohol Use Never     Social History     Substance and Sexual Activity   Drug Use Never     Social History     Substance and Sexual Activity   Sexual Activity Not on file     has no history on file for sexual activity.  Social History     Tobacco Use   Smoking Status Former   Smokeless Tobacco Never       Allergies:  Review of patient's allergies indicates:   Allergen Reactions    Atorvastatin Shortness Of Breath    Canagliflozin     Empagliflozin     Glipizide      Drops Blood sugar to much    Hydrocodone-acetaminophen Itching and Other (See Comments)     Other reaction(s): chest feels constricted         Current Outpatient Medications:     amiodarone (PACERONE) 200 MG Tab, Take 100 mg by mouth once daily., Disp: , Rfl:     amLODIPine (NORVASC) 5 MG tablet, Take 5 mg by mouth 2 (two) times daily., Disp: , Rfl:     carvediloL (COREG) 12.5 MG tablet, Take 12.5 mg by mouth 2 (two) times daily with meals., Disp: , Rfl:     cholecalciferol, vitamin D3, (VITAMIN D3) 50 mcg (2,000 unit) Cap capsule, Take by mouth once daily., Disp: , Rfl:     cilostazoL (PLETAL) 100 MG Tab, Take 100 mg by mouth 2 (two) times daily., Disp: , Rfl:     cyanocobalamin 500 MCG tablet, Take 1,000 mcg by mouth once daily., Disp: , Rfl:     dapagliflozin (FARXIGA) 10 mg tablet, Take 10 mg by mouth once daily., Disp: , Rfl:     ezetimibe (ZETIA) 10 mg tablet, Take 10 mg by mouth once daily., Disp: , Rfl:     furosemide (LASIX) 20 MG tablet, Take 20 mg by mouth as needed., Disp: , Rfl:     gabapentin (NEURONTIN) 100 MG capsule, Take 100 mg by mouth once daily., Disp: , Rfl:     LEVEMIR FLEXPEN 100 unit/mL (3 mL) InPn pen, Inject 48 Units into the skin 2 (two) times daily., Disp: , Rfl:     levothyroxine (SYNTHROID) 50 MCG tablet, Take 75 mcg by mouth once daily., Disp: , Rfl:     LIDOcaine (LIDODERM) 5 %, prn, Disp: , Rfl:      "liraglutide 0.6 mg/0.1 mL, 18 mg/3 mL, subq PNIJ (VICTOZA 2-MICHAEL) 0.6 mg/0.1 mL (18 mg/3 mL) PnIj pen, Inject 0.6 mg into the skin once daily. 1.8 units qd, Disp: , Rfl:     lisinopriL (PRINIVIL,ZESTRIL) 20 MG tablet, Take 20 mg by mouth 2 (two) times daily., Disp: , Rfl:     oxyCODONE-acetaminophen (PERCOCET)  mg per tablet, Take 1 tablet by mouth every 8 (eight) hours as needed., Disp: , Rfl:     pantoprazole (PROTONIX) 40 MG tablet, Take 40 mg by mouth once daily., Disp: , Rfl:     rivaroxaban (XARELTO) 20 mg Tab, Take 20 mg by mouth once daily at 6am., Disp: , Rfl:     tamsulosin (FLOMAX) 0.4 mg Cap, Take 0.4 mg by mouth once daily., Disp: , Rfl:     tiZANidine (ZANAFLEX) 4 MG tablet, Take 4 mg by mouth as needed., Disp: , Rfl:     VITAMIN D2 1,250 mcg (50,000 unit) capsule, Take 50,000 Units by mouth every 7 days., Disp: , Rfl:     ROS:  Review of Systems   Constitutional:  Negative for activity change, appetite change and fatigue.   Eyes:  Negative for visual disturbance.   Respiratory:  Negative for shortness of breath.    Cardiovascular:  Negative for chest pain, palpitations and leg swelling.   Gastrointestinal:  Negative for abdominal pain, constipation, nausea and vomiting.   Genitourinary:  Positive for difficulty urinating. Negative for decreased urine volume, dysuria, flank pain, frequency and urgency.   Musculoskeletal:  Negative for back pain and joint swelling.   Skin:  Negative for color change and rash.   Neurological:  Negative for dizziness, weakness, light-headedness and headaches.     Physical Exam:   Vitals:   Vitals:    03/03/23 1302   BP: (!) 123/58   Pulse: 83   SpO2: 96%   Weight: 119.7 kg (264 lb)   Height: 6' 3" (1.905 m)     Body mass index is 33 kg/m².    Physical Exam  Vitals reviewed. Exam conducted with a chaperone present (DIL - Via telephone).   Constitutional:       General: He is awake. He is not in acute distress.     Appearance: He is obese.   HENT:      Head: " Normocephalic.      Mouth/Throat:      Mouth: Mucous membranes are moist.   Eyes:      General: No scleral icterus.     Conjunctiva/sclera: Conjunctivae normal.      Pupils: Pupils are equal, round, and reactive to light.   Cardiovascular:      Rate and Rhythm: Normal rate and regular rhythm.      Heart sounds: S1 normal and S2 normal. Murmur heard.   Systolic murmur is present with a grade of 2/6.      Comments: Trace bilateral pedal edema   Pulmonary:      Effort: Pulmonary effort is normal.      Breath sounds: Normal breath sounds. No wheezing, rhonchi or rales.   Abdominal:      General: There is no distension.      Palpations: Abdomen is soft. There is no mass.   Musculoskeletal:         General: No swelling.      Right lower leg: Edema present.      Left lower leg: Edema present.   Skin:     General: Skin is warm and dry.      Nails: There is no clubbing.   Neurological:      General: No focal deficit present.      Mental Status: He is alert and oriented to person, place, and time.   Psychiatric:         Mood and Affect: Mood and affect normal.         Thought Content: Thought content normal.       Labs/Tests:    Contains abnormal data RENAL FUNCTION PANEL     Ref Range & Units 1 d ago   Sodium 136 - 147 mmol/L 137    Potassium 3.5 - 5.1 mmol/L 4.7    Chloride 98 - 107 mmol/L 105    CO2 20 - 31 mmol/L 22    Glucose 70 - 99 mg/dL 205 High     BUN 9 - 23 mg/dL 30 High     Creatinine 0.70 - 1.30 mg/dL 2.15 High     Calcium 8.3 - 10.6 mg/dL 9.6    Phosphorus Level 2.4 - 5.1 mg/dL 4.3    Albumin Level 3.2 - 4.8 g/dL 4.4    Anion Gap 5.0 - 15.0 mmol/L 9.8    eGFR CKD-EPI >90 ml/min/1.73m2 31 Low       Lab Results   Component Value Date    PTH 53 03/01/2023    HGB 12.9 03/01/2023    HGB 13.0 10/20/2022    HGB 13.3 06/22/2022    HGBA1C 10.7 (H) 01/02/2023    TRIG 212 (H) 09/26/2022    CHOL 148 09/26/2022    HDL 28 (L) 09/26/2022    LDLCALC 78 09/26/2022    LABVLDL 42 (H) 09/26/2022    GSFGPXTR22CR 85.6 03/01/2023        Lab Results   Component Value Date    UPROTCREA 0.3 (H) 03/01/2023    UPROTCREA 0.8 (H) 10/20/2022    UPROTCREA 1.7 (H) 06/22/2022         Diagnosis:  Plan and Assessment:  1. Diabetic nephropathy associated with type 2 diabetes mellitus  Assessment & Plan:  Poorly Controlled with HgbA1c 10.7% on 1/2/23 - Continue Farxiga 10 mg po daily, Levemir, Victoza,     Orders:  -     Renal Function Panel; Future; Expected date: 06/03/2023  -     Protein/Creatinine Ratio, Urine; Future; Expected date: 06/03/2023    2. Hypertension with impaired renal function  Assessment & Plan:  At goal, Monitor BP, 2 Gram NA diet, Continue Amlodipine 5 mg po BID, Coreg 12.5 mg po BID, Lisinopril 20 mg po BID, Lasix 20 mg po daily only PRN     Orders:  -     Renal Function Panel; Future; Expected date: 06/03/2023    3. Stage 3b chronic kidney disease  Assessment & Plan:  Serum Creatinine 2.15 / eGFR 31 (1.78 / eGFR 37 on 10/20/22) - without Anemia - without SHPT - Avoid NSAIDS, Assure 64 oz of water daily, Meds per med list above     Orders:  -     Hemoglobin; Future; Expected date: 06/03/2023  -     Renal Function Panel; Future; Expected date: 06/03/2023  -     PTH, Intact; Future; Expected date: 06/03/2023  -     Protein/Creatinine Ratio, Urine; Future; Expected date: 06/03/2023    4. Non-nephrotic range proteinuria  Assessment & Plan:  300 mg - down from 800 mg  - ACE / SGLT2     Orders:  -     Protein/Creatinine Ratio, Urine; Future; Expected date: 06/03/2023    5. Vitamin D deficiency  Assessment & Plan:  Vitamin D 85.6 - Managed by Endocrinology -  Currently on 50,000 units weekly plus 2000 units po daily       6. Hyperlipidemia, mixed  Assessment & Plan:  Continue Zetia 10 mg po daily       7. Coronary artery disease involving native coronary artery of native heart without angina pectoris  Overview:    1/3/22  The left ventricular ejection fraction is normal and measured at 55-60%.  Limited quality study due to poor sonographic  images in general  No significant valvular heart disease identified.      Assessment & Plan:  Managed by Cardiology - JAKE, Zetia, Lasix prn, Pletal       8. Class 1 obesity due to excess calories with serious comorbidity and body mass index (BMI) of 33.0 to 33.9 in adult  Assessment & Plan:  BMI 33 - Currently exercises 30 minutes 3 x per week on elliptical .              My reconciliation of medication should not condone or support use of medications that have been previously prescribed for patients by other providers.  I am only documenting the current medications patients is taking and may change doses, add or discontinue medications based on information provided by the patient.     The patient has been provided with their current level of kidney function including eGFR and creatinine.    We discussed the potential for common complications of CKD including anemia, electrolyte abnormalities, abnormal fluid balance, mineral bone disease and malnutrition.    We discussed strategies to slow the progression of their kidney disease including:  Avoid nephrotoxic agents. Avoid over-the-counter and prescription NSAIDs (Ibuprofren, Motrin, Naproxyn, Aleve, Mobic, Celebrex, Toradol, Advil). All of these can worsen kidney function, elevate BP, cause fluid retention/swelling and elevate potassium. Avoid iodine contrast agents and gadolinium, which can worsen kidney function and/or cause kidney failure. Avoid phosphosoda bowel preps which can worsen kidney function.  Work to improve modifiable risk factors. Aim for good control of blood glucose without episodes of hypoglycemia. Notify the provider managing your diabetes if your blood glucose < 60. Aim for good blood pressure control without episodes of hypotension. Call the office if your systolic blood pressure is consistently < 110. Aim for good control of your cholesterol.  AIC goal <7.0  BP goal <130/80        Keeping these in goal range will help prevent progression of  cardiovascular disease            and chronic kidney disease.    We discussed dietary modifications:  Low sodium diet: 2 gm/d or less  Limit/avoid high potassium foods  Avoid potassium containing salt substitutes  Limit/avoid high phosphorus foods  Limit daily protein intake to 0.8-1 gm/kg of your ideal body weight.    We discussed lifestyle modifications:  Make sure you are drinking plenty of fluids--64 ounces (1/2 gallon) daily  Exercise at least 30 minutes 5 x per week (total 150 minutes per week), example brisk walking  Achieve and maintain a healthy weight (BMI 20-25)  Limit alcohol consumption to <2 drinks per day  Stop smoking  Make sure you stay current on important vaccines-- pneumonia vaccines (Pneumovax and Prevnar), flu vaccine, Hepatitis B (especially patients nearing renal replacement therapy and planning hemodialysis) and Covid-19 vaccine.     Recommendations:  Monitor your BP at home daily and record.  Bring readings to your next appt.  Call the office if your BP is persistently >130/80.  Seek urgent medical attention with signs and symptoms of uremia - extreme weakness, fatigue, confusion, anorexia, metallic taste in mouth, hiccoughs, cramping, itching, chest pain, swelling, or trouble sleeping.    Follow Up:   Follow up in about 3 months (around 6/3/2023).

## 2023-03-02 NOTE — ASSESSMENT & PLAN NOTE
Poorly Controlled with HgbA1c 10.7% on 1/2/23 - Continue Farxiga 10 mg po daily, Levemir, Victoza,

## 2023-03-03 ENCOUNTER — OFFICE VISIT (OUTPATIENT)
Dept: NEPHROLOGY | Facility: CLINIC | Age: 73
End: 2023-03-03
Payer: COMMERCIAL

## 2023-03-03 VITALS
BODY MASS INDEX: 32.83 KG/M2 | HEART RATE: 83 BPM | DIASTOLIC BLOOD PRESSURE: 58 MMHG | SYSTOLIC BLOOD PRESSURE: 123 MMHG | WEIGHT: 264 LBS | HEIGHT: 75 IN | OXYGEN SATURATION: 96 %

## 2023-03-03 DIAGNOSIS — E66.09 CLASS 1 OBESITY DUE TO EXCESS CALORIES WITH SERIOUS COMORBIDITY AND BODY MASS INDEX (BMI) OF 33.0 TO 33.9 IN ADULT: ICD-10-CM

## 2023-03-03 DIAGNOSIS — R80.9 NON-NEPHROTIC RANGE PROTEINURIA: ICD-10-CM

## 2023-03-03 DIAGNOSIS — E78.2 HYPERLIPIDEMIA, MIXED: ICD-10-CM

## 2023-03-03 DIAGNOSIS — E55.9 VITAMIN D DEFICIENCY: ICD-10-CM

## 2023-03-03 DIAGNOSIS — I25.10 CORONARY ARTERY DISEASE INVOLVING NATIVE CORONARY ARTERY OF NATIVE HEART WITHOUT ANGINA PECTORIS: ICD-10-CM

## 2023-03-03 DIAGNOSIS — N18.32 STAGE 3B CHRONIC KIDNEY DISEASE: ICD-10-CM

## 2023-03-03 DIAGNOSIS — I12.9 HYPERTENSION WITH IMPAIRED RENAL FUNCTION: ICD-10-CM

## 2023-03-03 DIAGNOSIS — E11.21 DIABETIC NEPHROPATHY ASSOCIATED WITH TYPE 2 DIABETES MELLITUS: ICD-10-CM

## 2023-03-03 PROCEDURE — 99213 OFFICE O/P EST LOW 20 MIN: CPT | Mod: ,,, | Performed by: NURSE PRACTITIONER

## 2023-03-03 PROCEDURE — 99213 PR OFFICE/OUTPT VISIT, EST, LEVL III, 20-29 MIN: ICD-10-PCS | Mod: ,,, | Performed by: NURSE PRACTITIONER

## 2023-03-03 RX ORDER — LIDOCAINE 50 MG/G
1 PATCH TOPICAL
COMMUNITY

## 2023-03-03 RX ORDER — INSULIN DETEMIR 100 [IU]/ML
INJECTION, SOLUTION SUBCUTANEOUS
COMMUNITY
Start: 2023-02-25

## 2023-06-07 PROBLEM — I48.0 PAROXYSMAL ATRIAL FIBRILLATION: Status: ACTIVE | Noted: 2023-06-07

## 2023-06-07 NOTE — PROGRESS NOTES
Pt Name:  William Arthur Parmer Sr.  Pt :  1950  Pt MRN:  [id number]    Date: 2023    Reason for visit:   William Arthur Parmer Sr. is a 72 y.o. c male presenting for CKD follow up with serum creatinine 1.74, eGFR 40 and Chronic Kidney Disease Stage 3b.     Chief Complaint:   The patient denies any complaints today.    HPI:  The patient is being seen today for follow up CKD and the status of his kidney function. Since the last visit, patient reports/medical record review reveals he underwent bilateral cheek skin biopsies on 23 - both were returned as Squamous Cell Carcinoma - well differentiated. He continues to be followed by Dermatology.  He has had some issues with his BP dropping at home as well as at pain management to the 60s Systolic.  He and his wife have held his p.m. dose of Lisinopril and it seems to have corrected the issue. The patient denies fatigue, weakness, poor appetite, metallic taste, nausea, cramping, chest pain, palpitations, SOB, orthopnea, PND, edema, trouble concentrating, decreased urination.      Home BPs when checked are <130/80 per patient. His home Bps and BP at Pain Management was low in the 60s Systolic and he has decreased his Lisinopril from 20 mg po BID to daily and this has resolved. The patient is following a low sodium diet. The patient is avoiding NSAIDs. The patient is drinking 64 - 80 oz of water daily.     Since last visit on 3/3/23 patient reports above noted changes in medical history.      History:   Past Medical History:   Diagnosis Date    Back pain     Melgar esophagus     Cancer     skin cancer    Coronary artery disease     Diabetes mellitus, type II, insulin dependent 2022    Disorder of kidney and ureter     DM type 2 (diabetes mellitus, type 2)     Essential hypertension 2022    HTN (hypertension)     Internal hemorrhage     Irregular Z line of esophagus     Mixed hyperlipidemia     Neuropathy     Persistent proteinuria 2022     Secondary hyperparathyroidism of renal origin 6/22/2022     Past Surgical History:   Procedure Laterality Date    BACK SURGERY,LEFT FOOT,      CARDIAC CATHETERIZATION      CATARACT EXTRACTION      COLONOSCOPY      CORONARY ARTERY BYPASS GRAFT      CORONARY STENT PLACEMENT      ESOPHAGOGASTRODUODENOSCOPY      UPPER GASTROINTESTINAL ENDOSCOPY       Family History   Problem Relation Age of Onset    Hypertension Father     Diabetes Father     Cancer Father      Social History     Substance and Sexual Activity   Alcohol Use Never     Social History     Substance and Sexual Activity   Drug Use Never     Social History     Substance and Sexual Activity   Sexual Activity Not on file     has no history on file for sexual activity.  Social History     Tobacco Use   Smoking Status Former   Smokeless Tobacco Never       Allergies:  Review of patient's allergies indicates:   Allergen Reactions    Atorvastatin Shortness Of Breath    Canagliflozin     Glipizide      Drops Blood sugar to much    Hydrocodone-acetaminophen Itching and Other (See Comments)     Other reaction(s): chest feels constricted         Current Outpatient Medications:     amiodarone (PACERONE) 200 MG Tab, Take 100 mg by mouth once daily., Disp: , Rfl:     amLODIPine (NORVASC) 5 MG tablet, Take 5 mg by mouth 2 (two) times daily., Disp: , Rfl:     carvediloL (COREG) 12.5 MG tablet, Take 12.5 mg by mouth 2 (two) times daily with meals., Disp: , Rfl:     cholecalciferol, vitamin D3, (VITAMIN D3) 50 mcg (2,000 unit) Cap capsule, Take by mouth. 2,000 units daily, Disp: , Rfl:     cilostazoL (PLETAL) 100 MG Tab, Take 100 mg by mouth 2 (two) times daily., Disp: , Rfl:     cyanocobalamin 500 MCG tablet, Take 1,000 mcg by mouth once daily., Disp: , Rfl:     dapagliflozin (FARXIGA) 10 mg tablet, Take 10 mg by mouth once daily., Disp: , Rfl:     ezetimibe (ZETIA) 10 mg tablet, Take 10 mg by mouth once daily., Disp: , Rfl:     furosemide (LASIX) 20 MG tablet, Take 20 mg by  mouth as needed., Disp: , Rfl:     LEVEMIR FLEXPEN 100 unit/mL (3 mL) InPn pen, Inject into the skin. 62 units in the am 60 units in the pm, Disp: , Rfl:     levothyroxine (SYNTHROID) 75 MCG tablet, Take 75 mcg by mouth once daily., Disp: , Rfl:     LIDOcaine (LIDODERM) 5 %, Place 1 patch onto the skin as needed. prn, Disp: , Rfl:     liraglutide 0.6 mg/0.1 mL, 18 mg/3 mL, subq PNIJ (VICTOZA 2-MICHAEL) 0.6 mg/0.1 mL (18 mg/3 mL) PnIj pen, Inject into the skin once daily. 1.8 units qd, Disp: , Rfl:     lisinopriL (PRINIVIL,ZESTRIL) 20 MG tablet, Take 20 mg by mouth once daily., Disp: , Rfl:     oxyCODONE-acetaminophen (PERCOCET)  mg per tablet, Take 1 tablet by mouth every 8 (eight) hours as needed., Disp: , Rfl:     pantoprazole (PROTONIX) 40 MG tablet, Take 40 mg by mouth once daily., Disp: , Rfl:     rivaroxaban (XARELTO) 20 mg Tab, Take 20 mg by mouth once daily at 6am., Disp: , Rfl:     tamsulosin (FLOMAX) 0.4 mg Cap, Take 0.4 mg by mouth once daily., Disp: , Rfl:     tiZANidine (ZANAFLEX) 4 MG tablet, Take 4 mg by mouth as needed., Disp: , Rfl:     nitroGLYCERIN (NITROSTAT) 0.4 MG SL tablet, As needed, Disp: , Rfl:     ROS:  Review of Systems   Constitutional:  Negative for activity change, appetite change and fatigue.   Eyes:  Negative for visual disturbance.   Respiratory:  Negative for shortness of breath.    Cardiovascular:  Negative for chest pain, palpitations and leg swelling.   Gastrointestinal:  Negative for abdominal pain, constipation, nausea and vomiting.   Genitourinary:  Negative for decreased urine volume, difficulty urinating, dysuria, flank pain, frequency and urgency.   Musculoskeletal:  Negative for back pain and joint swelling.   Skin:  Negative for color change and rash.   Neurological:  Negative for dizziness, weakness, light-headedness and headaches.     Physical Exam:   Vitals:   Vitals:    06/09/23 1336   BP: (!) 117/57   Pulse: (!) 57   SpO2: 98%   Weight: 119.3 kg (263 lb)   Height:  "6' 3" (1.905 m)     Body mass index is 32.87 kg/m².    Physical Exam  Vitals reviewed. Exam conducted with a chaperone present (RILEY).   Constitutional:       General: He is awake. He is not in acute distress.  HENT:      Head: Normocephalic.      Mouth/Throat:      Mouth: Mucous membranes are moist.   Eyes:      General: No scleral icterus.     Conjunctiva/sclera: Conjunctivae normal.      Pupils: Pupils are equal, round, and reactive to light.   Cardiovascular:      Rate and Rhythm: Regular rhythm. Bradycardia present.      Heart sounds: S1 normal and S2 normal. Murmur heard.   Systolic murmur is present with a grade of 1/6.   Pulmonary:      Effort: Pulmonary effort is normal.      Breath sounds: Normal breath sounds. No wheezing, rhonchi or rales.   Abdominal:      General: There is no distension.      Palpations: Abdomen is soft. There is no mass.   Musculoskeletal:         General: No swelling.      Right lower leg: No edema.      Left lower leg: Edema present.   Skin:     General: Skin is warm and dry.      Nails: There is no clubbing.   Neurological:      General: No focal deficit present.      Mental Status: He is alert and oriented to person, place, and time.   Psychiatric:         Mood and Affect: Mood and affect normal.         Thought Content: Thought content normal.       Labs/Tests:    RENAL FUNCTION PANEL 6/5/23     Ref Range & Units 3 d ago   Sodium 136 - 147 mmol/L 141    Potassium 3.5 - 5.1 mmol/L 3.9    Chloride 98 - 107 mmol/L 107    CO2 20 - 31 mmol/L 24    Glucose 70 - 99 mg/dL 103 High     BUN 9 - 23 mg/dL 18    Creatinine 0.70 - 1.30 mg/dL 1.74 High     Calcium 8.3 - 10.6 mg/dL 9.1    Phosphorus Level 2.4 - 5.1 mg/dL 3.8    Albumin Level 3.2 - 4.8 g/dL 4.4    Anion Gap 5.0 - 15.0 mmol/L 10.1    eGFR CKD-EPI >90 ml/min/1.73m2 40 Low       Lab Results   Component Value Date    PTH 85 (H) 06/05/2023    HGB 12.8 06/05/2023    HGB 12.9 03/01/2023    HGB 13.0 10/20/2022    HGBA1C 10.7 (H) " 01/02/2023    TRIG 212 (H) 09/26/2022    CHOL 148 09/26/2022    HDL 28 (L) 09/26/2022    LDLCALC 78 09/26/2022    LABVLDL 42 (H) 09/26/2022    [id number] 85.6 03/01/2023       Lab Results   Component Value Date    UPROTCREA 0.3 (H) 06/05/2023    UPROTCREA 0.3 (H) 03/01/2023    UPROTCREA 0.8 (H) 10/20/2022         Diagnosis:  Plan and Assessment:  1. Paroxysmal atrial fibrillation  Overview:  ECHO     1/3/22  The left ventricular ejection fraction is normal and measured at 55-60%.  Limited quality study due to poor sonographic images in general  No significant valvular heart disease identified.    Assessment & Plan:  Managed by Cardiology - Amiodarone, BB and Xarelto       2. Diabetic nephropathy associated with type 2 diabetes mellitus  Assessment & Plan:  Poorly Controlled with HgbA1c 10.7% on 1/2/23 - Continue Farxiga 10 mg po daily, Levemir, Victoza,     Orders:  -     Renal Function Panel; Future; Expected date: 09/09/2023    3. Hypertension with impaired renal function  Assessment & Plan:  At goal, Monitor BP, 2 Gram NA diet, Continue Amlodipine 5 mg po BID, Coreg 12.5 mg po BID, Lisinopril 20 mg po daily, Lasix 20 mg po daily only PRN     Orders:  -     Renal Function Panel; Future; Expected date: 09/09/2023    4. Stage 3b chronic kidney disease  Assessment & Plan:  Serum Creatinine 1.74 / eGFR 40; (2.15 / eGFR 31 on 3/1/23 and 1.78 / eGFR 37 on 10/20/22) - without Anemia -  Avoid NSAIDS, Assure 64 oz of water daily, Meds per med list above     Orders:  -     Hemoglobin; Future; Expected date: 09/09/2023  -     Vitamin D; Future; Expected date: 09/09/2023  -     Renal Function Panel; Future; Expected date: 09/09/2023  -     Protein/Creatinine Ratio, Urine; Future; Expected date: 09/09/2023  -     PTH, Intact; Future; Expected date: 09/09/2023    5. Secondary hyperparathyroidism of renal origin  Assessment & Plan:  PTH 85 - No indication for specialized Vitamin D at this time.     Orders:  -     PTH, Intact;  Future; Expected date: 09/09/2023    6. Non-nephrotic range proteinuria  Assessment & Plan:  300 mg -  ACE / SGLT2     Orders:  -     Protein/Creatinine Ratio, Urine; Future; Expected date: 09/09/2023    7. Vitamin D deficiency  Assessment & Plan:  Vitamin D 85.6 - Managed by Endocrinology -  Just completed 50,000 units weekly, currently on 2000 units po daily     Orders:  -     Vitamin D; Future; Expected date: 09/09/2023  -     PTH, Intact; Future; Expected date: 09/09/2023    8. Hyperlipidemia, mixed  Assessment & Plan:  Continue Zetia 10 mg po daily       9. Coronary artery disease involving native coronary artery of native heart without angina pectoris  Overview:    1/3/22  The left ventricular ejection fraction is normal and measured at 55-60%.  Limited quality study due to poor sonographic images in general  No significant valvular heart disease identified.      Assessment & Plan:  Managed by Cardiology - JAKE Turpin, and Lasix is prn       10. Class 1 obesity due to excess calories with serious comorbidity and body mass index (BMI) of 32.0 to 32.9 in adult  Assessment & Plan:  BMI 32.87 - Currently exercises 30 minutes 3 x per week on elliptical .              My reconciliation of medication should not condone or support use of medications that have been previously prescribed for patients by other providers.  I am only documenting the current medications patients is taking and may change doses, add or discontinue medications based on information provided by the patient.     The patient has been provided with their current level of kidney function including eGFR and creatinine.    We discussed the potential for common complications of CKD including anemia, electrolyte abnormalities, abnormal fluid balance, mineral bone disease and malnutrition.    We discussed strategies to slow the progression of their kidney disease including:  Avoid nephrotoxic agents. Avoid over-the-counter and prescription NSAIDs  (Ibuprofren, Motrin, Naproxyn, Aleve, Mobic, Celebrex, Toradol, Advil). All of these can worsen kidney function, elevate BP, cause fluid retention/swelling and elevate potassium. Avoid iodine contrast agents and gadolinium, which can worsen kidney function and/or cause kidney failure. Avoid phosphosoda bowel preps which can worsen kidney function.  Work to improve modifiable risk factors. Aim for good control of blood glucose without episodes of hypoglycemia. Notify the provider managing your diabetes if your blood glucose < 60. Aim for good blood pressure control without episodes of hypotension. Call the office if your systolic blood pressure is consistently < 110. Aim for good control of your cholesterol.  AIC goal <7.0  BP goal <130/80        Keeping these in goal range will help prevent progression of cardiovascular disease            and chronic kidney disease.    We discussed dietary modifications:  Low sodium diet: 2 gm/d or less  Limit/avoid high potassium foods  Avoid potassium containing salt substitutes  Limit/avoid high phosphorus foods  Limit daily protein intake to 0.8-1 gm/kg of your ideal body weight.    We discussed lifestyle modifications:  Make sure you are drinking plenty of fluids--64 ounces (1/2 gallon) daily  Exercise at least 30 minutes 5 x per week (total 150 minutes per week), example brisk walking  Achieve and maintain a healthy weight (BMI 20-25)  Limit alcohol consumption to <2 drinks per day  Stop smoking  Make sure you stay current on important vaccines-- pneumonia vaccines (Pneumovax and Prevnar), flu vaccine, Hepatitis B (especially patients nearing renal replacement therapy and planning hemodialysis) and Covid-19 vaccine.     Recommendations:  Monitor your BP at home daily and record.  Bring readings to your next appt.  Call the office if your BP is persistently >130/80.  Seek urgent medical attention with signs and symptoms of uremia - extreme weakness, fatigue, confusion,  anorexia, metallic taste in mouth, hiccoughs, cramping, itching, chest pain, swelling, or trouble sleeping.    Follow Up:   Follow up in about 3 months (around 9/9/2023).

## 2023-06-08 NOTE — ASSESSMENT & PLAN NOTE
Serum Creatinine 1.74 / eGFR 40; (2.15 / eGFR 31 on 3/1/23 and 1.78 / eGFR 37 on 10/20/22) - without Anemia -  Avoid NSAIDS, Assure 64 oz of water daily, Meds per med list above

## 2023-06-08 NOTE — ASSESSMENT & PLAN NOTE
At goal, Monitor BP, 2 Gram NA diet, Continue Amlodipine 5 mg po BID, Coreg 12.5 mg po BID, Lisinopril 20 mg po daily, Lasix 20 mg po daily only PRN

## 2023-06-08 NOTE — ASSESSMENT & PLAN NOTE
Vitamin D 85.6 - Managed by Endocrinology -  Just completed 50,000 units weekly, currently on 2000 units po daily

## 2023-06-09 ENCOUNTER — OFFICE VISIT (OUTPATIENT)
Dept: NEPHROLOGY | Facility: CLINIC | Age: 73
End: 2023-06-09
Payer: COMMERCIAL

## 2023-06-09 VITALS
BODY MASS INDEX: 32.7 KG/M2 | HEIGHT: 75 IN | OXYGEN SATURATION: 98 % | WEIGHT: 263 LBS | SYSTOLIC BLOOD PRESSURE: 117 MMHG | DIASTOLIC BLOOD PRESSURE: 57 MMHG | HEART RATE: 57 BPM

## 2023-06-09 DIAGNOSIS — I12.9 HYPERTENSION WITH IMPAIRED RENAL FUNCTION: ICD-10-CM

## 2023-06-09 DIAGNOSIS — E55.9 VITAMIN D DEFICIENCY: ICD-10-CM

## 2023-06-09 DIAGNOSIS — R80.9 NON-NEPHROTIC RANGE PROTEINURIA: ICD-10-CM

## 2023-06-09 DIAGNOSIS — N25.81 SECONDARY HYPERPARATHYROIDISM OF RENAL ORIGIN: ICD-10-CM

## 2023-06-09 DIAGNOSIS — I48.0 PAROXYSMAL ATRIAL FIBRILLATION: ICD-10-CM

## 2023-06-09 DIAGNOSIS — N18.32 STAGE 3B CHRONIC KIDNEY DISEASE: ICD-10-CM

## 2023-06-09 DIAGNOSIS — E11.21 DIABETIC NEPHROPATHY ASSOCIATED WITH TYPE 2 DIABETES MELLITUS: ICD-10-CM

## 2023-06-09 DIAGNOSIS — E66.09 CLASS 1 OBESITY DUE TO EXCESS CALORIES WITH SERIOUS COMORBIDITY AND BODY MASS INDEX (BMI) OF 32.0 TO 32.9 IN ADULT: ICD-10-CM

## 2023-06-09 DIAGNOSIS — E78.2 HYPERLIPIDEMIA, MIXED: ICD-10-CM

## 2023-06-09 DIAGNOSIS — I25.10 CORONARY ARTERY DISEASE INVOLVING NATIVE CORONARY ARTERY OF NATIVE HEART WITHOUT ANGINA PECTORIS: ICD-10-CM

## 2023-06-09 PROCEDURE — 99213 PR OFFICE/OUTPT VISIT, EST, LEVL III, 20-29 MIN: ICD-10-PCS | Mod: S$GLB,,, | Performed by: NURSE PRACTITIONER

## 2023-06-09 PROCEDURE — 99213 OFFICE O/P EST LOW 20 MIN: CPT | Mod: S$GLB,,, | Performed by: NURSE PRACTITIONER

## 2023-06-09 RX ORDER — NITROGLYCERIN 0.4 MG/1
TABLET SUBLINGUAL
COMMUNITY

## 2023-10-19 ENCOUNTER — OFFICE VISIT (OUTPATIENT)
Dept: NEPHROLOGY | Facility: CLINIC | Age: 73
End: 2023-10-19
Payer: COMMERCIAL

## 2023-10-19 VITALS
WEIGHT: 276 LBS | SYSTOLIC BLOOD PRESSURE: 108 MMHG | OXYGEN SATURATION: 97 % | BODY MASS INDEX: 34.32 KG/M2 | DIASTOLIC BLOOD PRESSURE: 68 MMHG | HEIGHT: 75 IN | HEART RATE: 61 BPM

## 2023-10-19 DIAGNOSIS — E55.9 VITAMIN D DEFICIENCY: ICD-10-CM

## 2023-10-19 DIAGNOSIS — I25.10 CORONARY ARTERY DISEASE INVOLVING NATIVE CORONARY ARTERY OF NATIVE HEART WITHOUT ANGINA PECTORIS: ICD-10-CM

## 2023-10-19 DIAGNOSIS — E78.2 HYPERLIPIDEMIA, MIXED: ICD-10-CM

## 2023-10-19 DIAGNOSIS — N25.81 SECONDARY HYPERPARATHYROIDISM OF RENAL ORIGIN: ICD-10-CM

## 2023-10-19 DIAGNOSIS — E11.21 DIABETIC NEPHROPATHY ASSOCIATED WITH TYPE 2 DIABETES MELLITUS: ICD-10-CM

## 2023-10-19 DIAGNOSIS — N18.32 STAGE 3B CHRONIC KIDNEY DISEASE: ICD-10-CM

## 2023-10-19 DIAGNOSIS — E66.09 CLASS 1 OBESITY DUE TO EXCESS CALORIES WITH SERIOUS COMORBIDITY AND BODY MASS INDEX (BMI) OF 34.0 TO 34.9 IN ADULT: ICD-10-CM

## 2023-10-19 DIAGNOSIS — I48.0 PAROXYSMAL ATRIAL FIBRILLATION: ICD-10-CM

## 2023-10-19 DIAGNOSIS — I12.9 HYPERTENSION WITH IMPAIRED RENAL FUNCTION: ICD-10-CM

## 2023-10-19 DIAGNOSIS — R80.9 NON-NEPHROTIC RANGE PROTEINURIA: ICD-10-CM

## 2023-10-19 PROCEDURE — 99214 OFFICE O/P EST MOD 30 MIN: CPT | Mod: S$GLB,,, | Performed by: NURSE PRACTITIONER

## 2023-10-19 PROCEDURE — 99214 PR OFFICE/OUTPT VISIT, EST, LEVL IV, 30-39 MIN: ICD-10-PCS | Mod: S$GLB,,, | Performed by: NURSE PRACTITIONER

## 2023-10-19 RX ORDER — DOXYCYCLINE HYCLATE 100 MG
100 TABLET ORAL 2 TIMES DAILY
COMMUNITY
Start: 2023-10-12 | End: 2023-10-19

## 2023-10-19 RX ORDER — KETOCONAZOLE 200 MG/1
200 TABLET ORAL DAILY
COMMUNITY
Start: 2023-08-16

## 2023-10-19 RX ORDER — PIOGLITAZONEHYDROCHLORIDE 30 MG/1
30 TABLET ORAL DAILY
COMMUNITY
Start: 2023-10-12

## 2023-10-19 RX ORDER — SULFAMETHOXAZOLE AND TRIMETHOPRIM 800; 160 MG/1; MG/1
1 TABLET ORAL 2 TIMES DAILY
COMMUNITY
Start: 2023-10-12 | End: 2023-10-19

## 2023-10-19 RX ORDER — CALCIUM CARBONATE/VITAMIN D3 250-3.125
1 TABLET ORAL EVERY MORNING
COMMUNITY

## 2023-10-19 NOTE — ASSESSMENT & PLAN NOTE
Serum Creatinine 2.05 / eGFR 33;  (1.74 / eGFR 40 and 2.15 / eGFR 31 on 3/1/23 and 1.78 / eGFR 37 on 10/20/22) - without Anemia -  Avoid NSAIDS, Assure 64 oz of water daily, Meds per med list above     Recent foot infection and antibiotics for 7 days of Doxycycline 100 mg BID and Bactrim DS BID.

## 2023-10-19 NOTE — PROGRESS NOTES
Dictation #1  MRN:[id number]  CSN:[id number] Pt Name:  William Arthur Parmer Sr.  Pt :  1950  Pt MRN:  [id number]    Date: 10/19/2023    Reason for visit:   William Arthur Parmer Sr. is a 72 y.o. c male presenting for CKD follow up with serum creatinine 2.05, eGFR 33 and Chronic Kidney Disease Stage 3b.     Chief Complaint:   The patient denies any complaints today.    HPI:  The patient is being seen today for follow up CKD and the status of his kidney function. Since the last visit, patient reports he had a diabetic ulcer to the left foot and was treated with Doxycycline 100 mg po BID x 7 days and Bactrim DS po BID x 7 days. He completed both antibiotics po today.  He is followed by Dr. Myers and he plans to do an arterial study to assess blood flow in lower extremities. He sustained a mechanical fall at home on Tuesday.  He has some back pain from the fall. He has not had the back pain evaluated as he has chronic back pain and it just exacerbated the pain per the patient.   The patient denies fatigue, weakness, poor appetite, metallic taste, nausea, cramping, chest pain, palpitations, orthopnea, PND, trouble concentrating, decreased urination.  He c/o SOB with exertion and  edema.     Home BPs when checked are <130/80 per patient. The patient is following a low sodium diet. The patient is avoiding NSAIDs. The patient is drinking 72 oz of water daily.     Since last visit on 23 patient reports above noted changes in medical history.      History:   Past Medical History:   Diagnosis Date    Back pain     Melgar esophagus     Cancer     skin cancer    Coronary artery disease     Diabetes mellitus, type II, insulin dependent 2022    Disorder of kidney and ureter     DM type 2 (diabetes mellitus, type 2)     Essential hypertension 2022    HTN (hypertension)     Internal hemorrhage     Irregular Z line of esophagus     Mixed hyperlipidemia     Neuropathy     Persistent proteinuria 2022     Secondary hyperparathyroidism of renal origin 6/22/2022     Past Surgical History:   Procedure Laterality Date    BACK SURGERY,LEFT FOOT,      CARDIAC CATHETERIZATION      CATARACT EXTRACTION      COLONOSCOPY      CORONARY ARTERY BYPASS GRAFT      CORONARY STENT PLACEMENT      ESOPHAGOGASTRODUODENOSCOPY      UPPER GASTROINTESTINAL ENDOSCOPY       Family History   Problem Relation Age of Onset    Hypertension Father     Diabetes Father     Cancer Father      Social History     Substance and Sexual Activity   Alcohol Use Never     Social History     Substance and Sexual Activity   Drug Use Never     Social History     Substance and Sexual Activity   Sexual Activity Not on file     has no history on file for sexual activity.  Social History     Tobacco Use   Smoking Status Former   Smokeless Tobacco Never       Allergies:  Review of patient's allergies indicates:   Allergen Reactions    Atorvastatin Shortness Of Breath    Canagliflozin     Glipizide      Drops Blood sugar to much    Hydrocodone-acetaminophen Itching and Other (See Comments)     Other reaction(s): chest feels constricted         Current Outpatient Medications:     amiodarone (PACERONE) 200 MG Tab, Take 100 mg by mouth once daily., Disp: , Rfl:     amLODIPine (NORVASC) 5 MG tablet, Take 5 mg by mouth 2 (two) times daily., Disp: , Rfl:     calcium carbonate-vitamin D3 250-125 mg 250 mg-3.125 mcg (125 unit) Tab, Take 1 tablet by mouth every morning., Disp: , Rfl:     carvediloL (COREG) 12.5 MG tablet, Take 6.25 mg by mouth 2 (two) times daily with meals., Disp: , Rfl:     cholecalciferol, vitamin D3, (VITAMIN D3) 50 mcg (2,000 unit) Cap capsule, Take by mouth. 2,000 units daily, Disp: , Rfl:     cilostazoL (PLETAL) 100 MG Tab, Take 100 mg by mouth 2 (two) times daily., Disp: , Rfl:     cyanocobalamin 500 MCG tablet, Take 1,000 mcg by mouth once daily., Disp: , Rfl:     dapagliflozin (FARXIGA) 10 mg tablet, Take 10 mg by mouth once daily., Disp: , Rfl:      ezetimibe (ZETIA) 10 mg tablet, Take 10 mg by mouth once daily., Disp: , Rfl:     furosemide (LASIX) 20 MG tablet, Take 20 mg by mouth as needed., Disp: , Rfl:     ketoconazole (NIZORAL) 200 mg Tab, Take 200 mg by mouth once daily., Disp: , Rfl:     LEVEMIR FLEXPEN 100 unit/mL (3 mL) InPn pen, Inject into the skin. 30 units in the am 30 units in the pm, Disp: , Rfl:     levothyroxine (SYNTHROID) 75 MCG tablet, Take 75 mcg by mouth once daily., Disp: , Rfl:     LIDOcaine (LIDODERM) 5 %, Place 1 patch onto the skin as needed. prn, Disp: , Rfl:     lisinopriL (PRINIVIL,ZESTRIL) 20 MG tablet, Take 20 mg by mouth once daily., Disp: , Rfl:     nitroGLYCERIN (NITROSTAT) 0.4 MG SL tablet, As needed, Disp: , Rfl:     oxyCODONE-acetaminophen (PERCOCET)  mg per tablet, Take 1 tablet by mouth every 8 (eight) hours as needed., Disp: , Rfl:     pantoprazole (PROTONIX) 40 MG tablet, Take 40 mg by mouth once daily., Disp: , Rfl:     pioglitazone (ACTOS) 30 MG tablet, Take 30 mg by mouth once daily., Disp: , Rfl:     rivaroxaban (XARELTO) 20 mg Tab, Take 20 mg by mouth once daily at 6am., Disp: , Rfl:     tamsulosin (FLOMAX) 0.4 mg Cap, Take 0.4 mg by mouth once daily., Disp: , Rfl:     tiZANidine (ZANAFLEX) 4 MG tablet, Take 4 mg by mouth as needed., Disp: , Rfl:     ROS:  Review of Systems   Constitutional:  Negative for activity change, appetite change and fatigue.   Eyes:  Negative for visual disturbance.   Respiratory:  Positive for shortness of breath.    Cardiovascular:  Positive for leg swelling. Negative for chest pain and palpitations.   Gastrointestinal:  Negative for abdominal pain, constipation, nausea and vomiting.   Genitourinary:  Negative for decreased urine volume, difficulty urinating, dysuria, flank pain, frequency and urgency.   Musculoskeletal:  Negative for back pain and joint swelling.   Skin:  Negative for color change and rash.   Neurological:  Negative for dizziness, weakness, light-headedness and  "headaches.       Physical Exam:   Vitals:   Vitals:    10/19/23 1526   BP: 108/68   Pulse: 61   SpO2: 97%   Weight: 125.2 kg (276 lb)   Height: 6' 3" (1.905 m)     Body mass index is 34.5 kg/m².    Physical Exam  Vitals reviewed. Exam conducted with a chaperone present (RILEY).   Constitutional:       General: He is awake. He is not in acute distress.  HENT:      Head: Normocephalic.      Mouth/Throat:      Mouth: Mucous membranes are moist.   Eyes:      General: No scleral icterus.     Conjunctiva/sclera: Conjunctivae normal.      Pupils: Pupils are equal, round, and reactive to light.   Cardiovascular:      Rate and Rhythm: Normal rate and regular rhythm.      Heart sounds: S1 normal and S2 normal. Murmur heard.      Systolic murmur is present with a grade of 1/6.   Pulmonary:      Effort: Pulmonary effort is normal.      Breath sounds: Normal breath sounds. No wheezing, rhonchi or rales.   Abdominal:      General: There is no distension.      Palpations: Abdomen is soft. There is no mass.   Musculoskeletal:         General: No swelling.      Right lower leg: Edema present.      Left lower le+ Edema present.   Skin:     General: Skin is warm and dry.      Nails: There is no clubbing.   Neurological:      General: No focal deficit present.      Mental Status: He is alert and oriented to person, place, and time.   Psychiatric:         Mood and Affect: Mood and affect normal.         Thought Content: Thought content normal.           Labs/Tests 10/19/23:    Renal Function Panel  Specimen:  Blood - Blood   Ref Range & Units Today Comments   Sodium 136 - 147 mmol/L 138     Potassium 3.5 - 5.1 mmol/L 4.6     Chloride 98 - 107 mmol/L 107     CO2 20 - 31 mmol/L 22     Glucose 70 - 99 mg/dL 134 High      BUN 9 - 23 mg/dL 22     Creatinine 0.70 - 1.30 mg/dL 2.05 High      Calcium 8.3 - 10.6 mg/dL 9.2     Phosphorus 2.4 - 5.1 mg/dL 4.0     Albumin 3.2 - 4.8 g/dL 4.3     Anion Gap 5.0 - 15.0 mmol/L 9.1     eGFR CKD-EPI >90 " ml/min/1.73m2 33 Low       HEMOGLOBIN     Ref Range & Units 08:48   Hemoglobin 11.9 - 16.8 g/dL 12.3        Lab Results   Component Value Date    PTH 77 (H) 10/19/2023    HGB 12.8 06/05/2023    HGB 12.9 03/01/2023    HGB 13.0 10/20/2022    HGBA1C 10.7 (H) 01/02/2023    TRIG 212 (H) 09/26/2022    CHOL 148 09/26/2022    HDL 28 (L) 09/26/2022    LDLCALC 78 09/26/2022    LABVLDL 42 (H) 09/26/2022    [id number] 38.4 10/19/2023         Lab Results   Component Value Date    UPROTCREA 0.4 (H) 10/19/2023    UPROTCREA 0.3 (H) 06/05/2023    UPROTCREA 0.3 (H) 03/01/2023         Diagnosis:  Plan and Assessment:  1. Diabetic nephropathy associated with type 2 diabetes mellitus  Assessment & Plan:  Poorly Controlled with HgbA1c 8 - 9 % on 7/27/23 per DIL - Continue Farxiga 10 mg po daily, Levemir, Actos 30 mg po daily     Orders:  -     Renal Function Panel; Future; Expected date: 02/16/2024    2. Hypertension with impaired renal function  Assessment & Plan:  At goal, Monitor BP, 2 Gram NA diet, Continue Amlodipine 5 mg po BID, Coreg 6.25 mg po BID, Lisinopril 20 mg po daily, Lasix 20 mg po daily only PRN     Orders:  -     Renal Function Panel; Future; Expected date: 02/16/2024    3. Stage 3b chronic kidney disease  Assessment & Plan:  Serum Creatinine 2.05 / eGFR 33;  (1.74 / eGFR 40 and 2.15 / eGFR 31 on 3/1/23 and 1.78 / eGFR 37 on 10/20/22) - without Anemia -  Avoid NSAIDS, Assure 64 oz of water daily, Meds per med list above     Recent foot infection and antibiotics for 7 days of Doxycycline 100 mg BID and Bactrim DS BID.     Orders:  -     Hemoglobin; Future; Expected date: 02/16/2024  -     Vitamin D; Future; Expected date: 02/16/2024  -     Renal Function Panel; Future; Expected date: 02/16/2024  -     PTH, Intact; Future; Expected date: 02/16/2024  -     Protein/Creatinine Ratio, Urine; Future; Expected date: 02/16/2024    4. Secondary hyperparathyroidism of renal origin  Assessment & Plan:  PTH 77 - No indication for  specialized Vitamin D at this time.     Orders:  -     PTH, Intact; Future; Expected date: 02/16/2024    5. Non-nephrotic range proteinuria  Assessment & Plan:  400 mg - up from 300 mg -  ACE / SGLT2     Orders:  -     Protein/Creatinine Ratio, Urine; Future; Expected date: 02/16/2024    6. Vitamin D deficiency  Assessment & Plan:  Vitamin D 38.4 - Managed by Endocrinology -  currently on 2000 units po daily     Orders:  -     Vitamin D; Future; Expected date: 02/16/2024    7. Hyperlipidemia, mixed  Assessment & Plan:  Continue Zetia 10 mg po daily       8. Coronary artery disease involving native coronary artery of native heart without angina pectoris  Overview:    1/3/22  The left ventricular ejection fraction is normal and measured at 55-60%.  Limited quality study due to poor sonographic images in general  No significant valvular heart disease identified.      Assessment & Plan:  Managed by Cardiology - JohnrJAKE jimenez, and Lasix is prn       9. Paroxysmal atrial fibrillation  Overview:  ECHO     1/3/22  The left ventricular ejection fraction is normal and measured at 55-60%.  Limited quality study due to poor sonographic images in general  No significant valvular heart disease identified.    Assessment & Plan:  Managed by Cardiology - Amiodarone, BB and Xarelto       10. Class 1 obesity due to excess calories with serious comorbidity and body mass index (BMI) of 34.0 to 34.9 in adult  Assessment & Plan:  BMI 34.5 - Currently exercises 30 minutes 3 x per week on elliptical .              My reconciliation of medication should not condone or support use of medications that have been previously prescribed for patients by other providers.  I am only documenting the current medications patients is taking and may change doses, add or discontinue medications based on information provided by the patient.     The patient has been provided with their current level of kidney function including eGFR and creatinine.    We  discussed the potential for common complications of CKD including anemia, electrolyte abnormalities, abnormal fluid balance, mineral bone disease and malnutrition.    We discussed strategies to slow the progression of their kidney disease including:  Avoid nephrotoxic agents. Avoid over-the-counter and prescription NSAIDs (Ibuprofren, Motrin, Naproxyn, Aleve, Mobic, Celebrex, Toradol, Advil). All of these can worsen kidney function, elevate BP, cause fluid retention/swelling and elevate potassium. Avoid iodine contrast agents and gadolinium, which can worsen kidney function and/or cause kidney failure. Avoid phosphosoda bowel preps which can worsen kidney function.  Work to improve modifiable risk factors. Aim for good control of blood glucose without episodes of hypoglycemia. Notify the provider managing your diabetes if your blood glucose < 60. Aim for good blood pressure control without episodes of hypotension. Call the office if your systolic blood pressure is consistently < 110. Aim for good control of your cholesterol.  AIC goal <7.0  BP goal <130/80        Keeping these in goal range will help prevent progression of cardiovascular disease            and chronic kidney disease.    We discussed dietary modifications:  Low sodium diet: 2 gm/d or less  Limit/avoid high potassium foods  Avoid potassium containing salt substitutes  Limit/avoid high phosphorus foods  Limit daily protein intake to 0.8-1 gm/kg of your ideal body weight.    We discussed lifestyle modifications:  Make sure you are drinking plenty of fluids--64 ounces (1/2 gallon) daily  Exercise at least 30 minutes 5 x per week (total 150 minutes per week), example brisk walking  Achieve and maintain a healthy weight (BMI 20-25)  Limit alcohol consumption to <2 drinks per day  Stop smoking  Make sure you stay current on important vaccines-- pneumonia vaccines (Pneumovax and Prevnar), flu vaccine, Hepatitis B (especially patients nearing renal  replacement therapy and planning hemodialysis) and Covid-19 vaccine.     Recommendations:  Monitor your BP at home daily and record.  Bring readings to your next appt.  Call the office if your BP is persistently >130/80.  Seek urgent medical attention with signs and symptoms of uremia - extreme weakness, fatigue, confusion, anorexia, metallic taste in mouth, hiccoughs, cramping, itching, chest pain, swelling, or trouble sleeping.    Follow Up:   Follow up in about 4 months (around 2/19/2024).

## 2023-10-19 NOTE — ASSESSMENT & PLAN NOTE
At goal, Monitor BP, 2 Gram NA diet, Continue Amlodipine 5 mg po BID, Coreg 6.25 mg po BID, Lisinopril 20 mg po daily, Lasix 20 mg po daily only PRN

## 2023-10-19 NOTE — ASSESSMENT & PLAN NOTE
Poorly Controlled with HgbA1c 8 - 9 % on 7/27/23 per DIL - Continue Farxiga 10 mg po daily, Levemir, Actos 30 mg po daily